# Patient Record
Sex: MALE | Race: WHITE
[De-identification: names, ages, dates, MRNs, and addresses within clinical notes are randomized per-mention and may not be internally consistent; named-entity substitution may affect disease eponyms.]

---

## 2019-01-29 ENCOUNTER — HOSPITAL ENCOUNTER (EMERGENCY)
Dept: HOSPITAL 26 - MED | Age: 19
Discharge: HOME | End: 2019-01-29
Payer: COMMERCIAL

## 2019-01-29 VITALS — SYSTOLIC BLOOD PRESSURE: 145 MMHG | DIASTOLIC BLOOD PRESSURE: 98 MMHG

## 2019-01-29 VITALS — HEIGHT: 72 IN | WEIGHT: 230 LBS | BODY MASS INDEX: 31.15 KG/M2

## 2019-01-29 DIAGNOSIS — F10.129: Primary | ICD-10-CM

## 2019-01-29 DIAGNOSIS — R03.0: ICD-10-CM
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
## 2019-01-29 DIAGNOSIS — Z02.89: ICD-10-CM
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
## 2019-01-29 NOTE — NUR
Patient discharged with v/s stable. Written and verbal after care instructions 
given and explained. 

Patient verbalized understanding. Ambulatory with [place] [employer] in custody. All 
questions addressed prior to discharge. Advised to follow up with PMD.
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
